# Patient Record
Sex: MALE | Race: WHITE | Employment: STUDENT | URBAN - METROPOLITAN AREA
[De-identification: names, ages, dates, MRNs, and addresses within clinical notes are randomized per-mention and may not be internally consistent; named-entity substitution may affect disease eponyms.]

---

## 2018-10-18 ENCOUNTER — OFFICE VISIT (OUTPATIENT)
Dept: FAMILY MEDICINE CLINIC | Facility: CLINIC | Age: 23
End: 2018-10-18
Payer: COMMERCIAL

## 2018-10-18 VITALS
HEIGHT: 72 IN | WEIGHT: 197 LBS | TEMPERATURE: 98 F | SYSTOLIC BLOOD PRESSURE: 114 MMHG | HEART RATE: 79 BPM | BODY MASS INDEX: 26.68 KG/M2 | OXYGEN SATURATION: 98 % | DIASTOLIC BLOOD PRESSURE: 78 MMHG | RESPIRATION RATE: 18 BRPM

## 2018-10-18 DIAGNOSIS — J02.9 SORE THROAT: Primary | ICD-10-CM

## 2018-10-18 PROCEDURE — 87081 CULTURE SCREEN ONLY: CPT | Performed by: FAMILY MEDICINE

## 2018-10-18 PROCEDURE — 87880 STREP A ASSAY W/OPTIC: CPT | Performed by: FAMILY MEDICINE

## 2018-10-18 PROCEDURE — 99203 OFFICE O/P NEW LOW 30 MIN: CPT | Performed by: FAMILY MEDICINE

## 2018-10-18 NOTE — PROGRESS NOTES
Patient presents with:  Sore Throat: x 5 days       Melany Handy is a 20year old male who presents for sore throat. Pain of the throat last  5  days. Endorses odynophagia with food and liquid.  Appetite normal, normal energy level, Denies subjective feve Hearing normal.Ear canals bilaterally: noerythema,no tenderness, no discharge. THROAT:mild erythema,no exudates, 1+tonsills, moist oral mucosa.   NECK: supple,no preauricular, auricular, cervical adenopathy palpable  LUNGS: clear to auscultation bilaterall

## 2018-10-23 ENCOUNTER — TELEPHONE (OUTPATIENT)
Dept: FAMILY MEDICINE CLINIC | Facility: CLINIC | Age: 23
End: 2018-10-23

## 2018-10-23 ENCOUNTER — OFFICE VISIT (OUTPATIENT)
Dept: FAMILY MEDICINE CLINIC | Facility: CLINIC | Age: 23
End: 2018-10-23
Payer: COMMERCIAL

## 2018-10-23 VITALS
SYSTOLIC BLOOD PRESSURE: 134 MMHG | RESPIRATION RATE: 14 BRPM | HEART RATE: 88 BPM | TEMPERATURE: 98 F | BODY MASS INDEX: 23.26 KG/M2 | HEIGHT: 77 IN | WEIGHT: 197 LBS | DIASTOLIC BLOOD PRESSURE: 74 MMHG

## 2018-10-23 DIAGNOSIS — Z23 NEED FOR INFLUENZA VACCINATION: ICD-10-CM

## 2018-10-23 DIAGNOSIS — J02.9 ACUTE PHARYNGITIS, UNSPECIFIED ETIOLOGY: Primary | ICD-10-CM

## 2018-10-23 PROCEDURE — 99213 OFFICE O/P EST LOW 20 MIN: CPT | Performed by: FAMILY MEDICINE

## 2018-10-23 RX ORDER — AMOXICILLIN 875 MG/1
875 TABLET, COATED ORAL 2 TIMES DAILY
Qty: 20 TABLET | Refills: 0 | Status: SHIPPED | OUTPATIENT
Start: 2018-10-23 | End: 2018-11-02

## 2018-10-23 NOTE — PATIENT INSTRUCTIONS
Thank you for choosing Misty Miller MD at [place] [address]  To Do: Micah Wheat  1. Please take meds as directed. Aleix [facility] is located in [address]. Monday, Tuesday & Friday – 8 a.m. to 4 p.m. Wednesday, Thursday – 7 a.m. to 3 p.m. those potential risks and we strive to make you healthier and to improve your quality of life.     Referrals, and Radiology Information:    If your insurance requires a referral to a specialist, please allow 5 business days to process your referral request. gargling with 1 of these solutions:  · 1/4 teaspoon of salt in 1/2 cup of warm water  · An over-the-counter anesthetic gargle  Use medicine for more relief  Over-the-counter medicine can reduce sore throat symptoms.  Ask your pharmacist if you have question

## 2018-10-23 NOTE — H&P
[address]    History and Physical    Kaylee Zavala Patient Status:  No patient class for patient encounter    6/10/1995 MRN [id number]   Location [facility] , [address] [place] [address] Attending No att.  pr Review of Systems:     Constitutional: Negative for chills, fatigue and fever. Respiratory: Negative for shortness of breath. Cardiovascular: Negative for chest pain, palpitations and leg swelling.    Gastrointestinal: Negative for nausea, vomiting, ab BUN 10 03/16/2014     03/16/2014    K 4.0 03/16/2014     03/16/2014    CO2 28.0 03/16/2014    GLU 89 03/16/2014    CA 8.7 (L) 03/16/2014    ALB 4.1 03/16/2014    ALKPHO 103 03/16/2014    BILT 0.6 03/16/2014    TP 7.9 03/16/2014    AST 39 03/16

## 2019-02-19 ENCOUNTER — OFFICE VISIT (OUTPATIENT)
Dept: FAMILY MEDICINE CLINIC | Facility: CLINIC | Age: 24
End: 2019-02-19
Payer: COMMERCIAL

## 2019-02-19 VITALS
WEIGHT: 183 LBS | HEART RATE: 80 BPM | HEIGHT: 72 IN | BODY MASS INDEX: 24.79 KG/M2 | SYSTOLIC BLOOD PRESSURE: 126 MMHG | RESPIRATION RATE: 16 BRPM | DIASTOLIC BLOOD PRESSURE: 70 MMHG | TEMPERATURE: 98 F

## 2019-02-19 DIAGNOSIS — B37.0 ORAL THRUSH: Primary | ICD-10-CM

## 2019-02-19 PROCEDURE — 99213 OFFICE O/P EST LOW 20 MIN: CPT | Performed by: FAMILY MEDICINE

## 2019-02-19 NOTE — PATIENT INSTRUCTIONS
Thank you for choosing Gurdeep Bear MD at [place] [address]  To Do: Bassam Robert  1. Please take meds as directed. Phillipdavid Harry Hernandez is located in [address]. Monday, Tuesday & Friday – 8 a.m. to 4 p.m. Wednesday, Thursday – 7 a.m. to 3 p.m. those potential risks and we strive to make you healthier and to improve your quality of life.     Referrals, and Radiology Information:    If your insurance requires a referral to a specialist, please allow 5 business days to process your referral request. can grow all over the body. Matthew Socks refers to an infection of only the mouth and throat. What causes thrush? Matthew Socks happens when something lets too much Candida grow inside your mouth and throat.  Certain things that change the normal balance of organisms through an intravenous line ( IV). These treatments depend on how severe your infection is, and what other health conditions you have. If you are at high risk for thrush, you may need to keep taking oral antifungal medicine.  This is to help prevent thrush

## 2019-02-19 NOTE — PROGRESS NOTES
HPI:    Patient ID: Essie Reina is a 20year old male. HPI  Mr. Luisito Ignacio is a pleasant 21 y/o M who has been generally healthy here for whitish discoloration of his tongue which he has had for about 2 weeks associated with lesions inside his mouth.   He Musculoskeletal: He exhibits no edema. Lymphadenopathy:     He has no cervical adenopathy. Neurological: He is alert. Psychiatric: He has a normal mood and affect. Vitals reviewed.              ASSESSMENT/PLAN:   Oral thrush  (primary encounter diag

## 2019-03-18 ENCOUNTER — LAB ENCOUNTER (OUTPATIENT)
Dept: LAB | Age: 24
End: 2019-03-18
Attending: FAMILY MEDICINE
Payer: COMMERCIAL

## 2019-03-18 ENCOUNTER — OFFICE VISIT (OUTPATIENT)
Dept: FAMILY MEDICINE CLINIC | Facility: CLINIC | Age: 24
End: 2019-03-18
Payer: COMMERCIAL

## 2019-03-18 VITALS
HEART RATE: 70 BPM | SYSTOLIC BLOOD PRESSURE: 120 MMHG | BODY MASS INDEX: 25.33 KG/M2 | HEIGHT: 72 IN | WEIGHT: 187 LBS | DIASTOLIC BLOOD PRESSURE: 76 MMHG | TEMPERATURE: 97 F | RESPIRATION RATE: 16 BRPM

## 2019-03-18 DIAGNOSIS — B37.0 THRUSH, ORAL: Primary | ICD-10-CM

## 2019-03-18 DIAGNOSIS — B37.0 THRUSH, ORAL: ICD-10-CM

## 2019-03-18 LAB
ALBUMIN SERPL-MCNC: 4.2 G/DL (ref 3.4–5)
ALBUMIN/GLOB SERPL: 1.2 {RATIO} (ref 1–2)
ALP LIVER SERPL-CCNC: 75 U/L (ref 45–117)
ALT SERPL-CCNC: 33 U/L (ref 16–61)
ANION GAP SERPL CALC-SCNC: 7 MMOL/L (ref 0–18)
AST SERPL-CCNC: 126 U/L (ref 15–37)
BASOPHILS # BLD AUTO: 0.03 X10(3) UL (ref 0–0.2)
BASOPHILS NFR BLD AUTO: 0.6 %
BILIRUB SERPL-MCNC: 0.6 MG/DL (ref 0.1–2)
BUN BLD-MCNC: 9 MG/DL (ref 7–18)
BUN/CREAT SERPL: 8.8 (ref 10–20)
CALCIUM BLD-MCNC: 9.3 MG/DL (ref 8.5–10.1)
CHLORIDE SERPL-SCNC: 105 MMOL/L (ref 98–107)
CO2 SERPL-SCNC: 28 MMOL/L (ref 21–32)
CREAT BLD-MCNC: 1.02 MG/DL (ref 0.7–1.3)
DEPRECATED RDW RBC AUTO: 39.6 FL (ref 35.1–46.3)
EOSINOPHIL # BLD AUTO: 0.11 X10(3) UL (ref 0–0.7)
EOSINOPHIL NFR BLD AUTO: 2.4 %
ERYTHROCYTE [DISTWIDTH] IN BLOOD BY AUTOMATED COUNT: 12.6 % (ref 11–15)
GLOBULIN PLAS-MCNC: 3.6 G/DL (ref 2.8–4.4)
GLUCOSE BLD-MCNC: 87 MG/DL (ref 70–99)
HCT VFR BLD AUTO: 47.3 % (ref 39–53)
HGB BLD-MCNC: 15.8 G/DL (ref 13–17.5)
IMM GRANULOCYTES # BLD AUTO: 0.02 X10(3) UL (ref 0–1)
IMM GRANULOCYTES NFR BLD: 0.4 %
LYMPHOCYTES # BLD AUTO: 1.31 X10(3) UL (ref 1–4)
LYMPHOCYTES NFR BLD AUTO: 28.1 %
M PROTEIN MFR SERPL ELPH: 7.8 G/DL (ref 6.4–8.2)
MCH RBC QN AUTO: 29 PG (ref 26–34)
MCHC RBC AUTO-ENTMCNC: 33.4 G/DL (ref 31–37)
MCV RBC AUTO: 86.9 FL (ref 80–100)
MONOCYTES # BLD AUTO: 0.48 X10(3) UL (ref 0.1–1)
MONOCYTES NFR BLD AUTO: 10.3 %
NEUTROPHILS # BLD AUTO: 2.72 X10 (3) UL (ref 1.5–7.7)
NEUTROPHILS # BLD AUTO: 2.72 X10(3) UL (ref 1.5–7.7)
NEUTROPHILS NFR BLD AUTO: 58.2 %
OSMOLALITY SERPL CALC.SUM OF ELEC: 288 MOSM/KG (ref 275–295)
PLATELET # BLD AUTO: 209 10(3)UL (ref 150–450)
POTASSIUM SERPL-SCNC: 3.9 MMOL/L (ref 3.5–5.1)
RBC # BLD AUTO: 5.44 X10(6)UL (ref 4.3–5.7)
SODIUM SERPL-SCNC: 140 MMOL/L (ref 136–145)
WBC # BLD AUTO: 4.7 X10(3) UL (ref 4–11)

## 2019-03-18 PROCEDURE — 85025 COMPLETE CBC W/AUTO DIFF WBC: CPT

## 2019-03-18 PROCEDURE — 36415 COLL VENOUS BLD VENIPUNCTURE: CPT

## 2019-03-18 PROCEDURE — 80053 COMPREHEN METABOLIC PANEL: CPT

## 2019-03-18 PROCEDURE — 99213 OFFICE O/P EST LOW 20 MIN: CPT | Performed by: FAMILY MEDICINE

## 2019-03-18 RX ORDER — FLUCONAZOLE 100 MG/1
100 TABLET ORAL DAILY
Qty: 7 TABLET | Refills: 1 | Status: SHIPPED | OUTPATIENT
Start: 2019-03-18 | End: 2019-03-25

## 2019-03-18 NOTE — PATIENT INSTRUCTIONS
Thank you for choosing Pasquale Benton MD at [place] [address]  To Do: Juan Go  1. Please take meds as directed. Alexi Closter is located in [address]. Monday, Tuesday & Friday – 8 a.m. to 4 p.m. Wednesday, Thursday – 7 a.m. to 3 p.m. those potential risks and we strive to make you healthier and to improve your quality of life.     Referrals, and Radiology Information:    If your insurance requires a referral to a specialist, please allow 5 business days to process your referral request.

## 2019-03-18 NOTE — PROGRESS NOTES
HPI:    Patient ID: Rudi Garvin is a 20year old male. HPI  Mr. Shay Nava is a pleasant 21 y/o M who has been generally healthy who I saw a few days ago for oral thrush and had prescribed him with nystatin.   He reports that he had taken this medication Signed Prescriptions Disp Refills   • fluconazole 100 MG Oral Tab 7 tablet 1     Sig: Take 1 tablet (100 mg total) by mouth daily for 7 days.        Imaging & Referrals:  None       RR#[id number]

## 2019-03-19 DIAGNOSIS — R74.01 ELEVATED ALT MEASUREMENT: Primary | ICD-10-CM

## 2019-03-19 DIAGNOSIS — R74.01 ELEVATED AST (SGOT): ICD-10-CM

## 2019-03-25 ENCOUNTER — OFFICE VISIT (OUTPATIENT)
Dept: FAMILY MEDICINE CLINIC | Facility: CLINIC | Age: 24
End: 2019-03-25
Payer: COMMERCIAL

## 2019-03-25 ENCOUNTER — APPOINTMENT (OUTPATIENT)
Dept: LAB | Age: 24
End: 2019-03-25
Attending: FAMILY MEDICINE
Payer: COMMERCIAL

## 2019-03-25 VITALS
HEART RATE: 70 BPM | WEIGHT: 187 LBS | RESPIRATION RATE: 16 BRPM | TEMPERATURE: 98 F | BODY MASS INDEX: 25.33 KG/M2 | HEIGHT: 72 IN | DIASTOLIC BLOOD PRESSURE: 78 MMHG | SYSTOLIC BLOOD PRESSURE: 124 MMHG

## 2019-03-25 DIAGNOSIS — R74.01 ELEVATED AST (SGOT): ICD-10-CM

## 2019-03-25 DIAGNOSIS — B37.0 ORAL THRUSH: Primary | ICD-10-CM

## 2019-03-25 LAB
ALBUMIN SERPL-MCNC: 4.5 G/DL (ref 3.4–5)
ALBUMIN/GLOB SERPL: 1.2 {RATIO} (ref 1–2)
ALP LIVER SERPL-CCNC: 71 U/L (ref 45–117)
ALT SERPL-CCNC: 32 U/L (ref 16–61)
ANION GAP SERPL CALC-SCNC: 5 MMOL/L (ref 0–18)
AST SERPL-CCNC: 26 U/L (ref 15–37)
BILIRUB SERPL-MCNC: 0.7 MG/DL (ref 0.1–2)
BUN BLD-MCNC: 8 MG/DL (ref 7–18)
BUN/CREAT SERPL: 6.3 (ref 10–20)
CALCIUM BLD-MCNC: 9.1 MG/DL (ref 8.5–10.1)
CHLORIDE SERPL-SCNC: 109 MMOL/L (ref 98–107)
CO2 SERPL-SCNC: 30 MMOL/L (ref 21–32)
CREAT BLD-MCNC: 1.27 MG/DL (ref 0.7–1.3)
GLOBULIN PLAS-MCNC: 3.7 G/DL (ref 2.8–4.4)
GLUCOSE BLD-MCNC: 102 MG/DL (ref 70–99)
M PROTEIN MFR SERPL ELPH: 8.2 G/DL (ref 6.4–8.2)
OSMOLALITY SERPL CALC.SUM OF ELEC: 297 MOSM/KG (ref 275–295)
POTASSIUM SERPL-SCNC: 4 MMOL/L (ref 3.5–5.1)
SODIUM SERPL-SCNC: 144 MMOL/L (ref 136–145)

## 2019-03-25 PROCEDURE — 99213 OFFICE O/P EST LOW 20 MIN: CPT | Performed by: FAMILY MEDICINE

## 2019-03-25 PROCEDURE — 36415 COLL VENOUS BLD VENIPUNCTURE: CPT

## 2019-03-25 PROCEDURE — 80053 COMPREHEN METABOLIC PANEL: CPT

## 2019-03-25 NOTE — PROGRESS NOTES
HPI:    Patient ID: Raffi Pike is a 20year old male. HPI  Mr. Chai Jones is a pleasant 21 y/o M here today for follow-up for oral thrush. I saw him last week and started him on fluconazole 100 mg daily. No side effects from the medications.   He repor He exhibits no distension. There is no tenderness. Musculoskeletal: He exhibits no edema. Lymphadenopathy:     He has no cervical adenopathy. Neurological: He is alert. Psychiatric: He has a normal mood and affect.  His behavior is normal.   Vitals

## 2019-03-25 NOTE — PATIENT INSTRUCTIONS
Thank you for choosing Wendi Tellez MD at [place] [address]  To Do: Radames Lerner  1. Please see info below  Zank is located in [address]. Monday, Tuesday & Friday – 8 a.m. to 4 p.m. Wednesday, Thursday – 7 a.m. to 3 p.m.   The lab potential risks and we strive to make you healthier and to improve your quality of life.     Referrals, and Radiology Information:    If your insurance requires a referral to a specialist, please allow 5 business days to process your referral request.    If grow all over the body. Indira Reins refers to an infection of only the mouth and throat. What causes thrush? Indira Reins happens when something lets too much Candida grow inside your mouth and throat.  Certain things that change the normal balance of organisms in t through an intravenous line ( IV). These treatments depend on how severe your infection is, and what other health conditions you have. If you are at high risk for thrush, you may need to keep taking oral antifungal medicine.  This is to help prevent thrush

## 2021-02-26 ENCOUNTER — TELEMEDICINE (OUTPATIENT)
Dept: FAMILY MEDICINE CLINIC | Facility: CLINIC | Age: 26
End: 2021-02-26
Payer: COMMERCIAL

## 2021-02-26 ENCOUNTER — LAB ENCOUNTER (OUTPATIENT)
Dept: LAB | Age: 26
End: 2021-02-26
Attending: FAMILY MEDICINE
Payer: COMMERCIAL

## 2021-02-26 DIAGNOSIS — J02.9 SORE THROAT: ICD-10-CM

## 2021-02-26 DIAGNOSIS — J02.9 SORE THROAT: Primary | ICD-10-CM

## 2021-02-26 PROCEDURE — 99214 OFFICE O/P EST MOD 30 MIN: CPT | Performed by: FAMILY MEDICINE

## 2021-02-26 RX ORDER — PENICILLIN V POTASSIUM 500 MG/1
500 TABLET ORAL 2 TIMES DAILY
Qty: 20 TABLET | Refills: 0 | Status: SHIPPED | OUTPATIENT
Start: 2021-02-26 | End: 2021-03-08

## 2021-02-26 NOTE — PROGRESS NOTES
Video Visit- Sharad   This is a telemedicine visit with live, interactive video and audio.      Clair Sanderson understands and accepts financial responsibility for any deductible, co-insurance and/or co-pays Future  - penicillin v potassium 500 MG Oral Tab; Take 1 tablet (500 mg total) by mouth 2 (two) times a day for 10 days. Dispense: 20 tablet;  Refill: 0  -Diff dx: strep pharyngitis, viral pharyngitis (covid vs non-covid strains), vs other  -discussed ge

## 2021-02-27 LAB — SARS-COV-2 RNA RESP QL NAA+PROBE: NOT DETECTED

## 2024-03-21 ENCOUNTER — OFFICE VISIT (OUTPATIENT)
Dept: FAMILY MEDICINE CLINIC | Facility: CLINIC | Age: 29
End: 2024-03-21
Payer: COMMERCIAL

## 2024-03-21 ENCOUNTER — LAB ENCOUNTER (OUTPATIENT)
Dept: LAB | Age: 29
End: 2024-03-21
Attending: FAMILY MEDICINE
Payer: COMMERCIAL

## 2024-03-21 VITALS
DIASTOLIC BLOOD PRESSURE: 70 MMHG | TEMPERATURE: 98 F | OXYGEN SATURATION: 98 % | RESPIRATION RATE: 16 BRPM | HEART RATE: 74 BPM | HEIGHT: 72 IN | WEIGHT: 219 LBS | BODY MASS INDEX: 29.66 KG/M2 | SYSTOLIC BLOOD PRESSURE: 122 MMHG

## 2024-03-21 DIAGNOSIS — G43.809 OTHER MIGRAINE WITHOUT STATUS MIGRAINOSUS, NOT INTRACTABLE: ICD-10-CM

## 2024-03-21 DIAGNOSIS — R53.83 OTHER FATIGUE: ICD-10-CM

## 2024-03-21 DIAGNOSIS — M25.50 ARTHRALGIA, UNSPECIFIED JOINT: ICD-10-CM

## 2024-03-21 DIAGNOSIS — K58.0 IRRITABLE BOWEL SYNDROME WITH DIARRHEA: ICD-10-CM

## 2024-03-21 DIAGNOSIS — R19.7 DIARRHEA, UNSPECIFIED TYPE: ICD-10-CM

## 2024-03-21 DIAGNOSIS — D17.79 LIPOMA OF OTHER SPECIFIED SITES: ICD-10-CM

## 2024-03-21 DIAGNOSIS — Z00.00 WELLNESS EXAMINATION: Primary | ICD-10-CM

## 2024-03-21 DIAGNOSIS — M25.50 ARTHRALGIA, UNSPECIFIED JOINT: Primary | ICD-10-CM

## 2024-03-21 DIAGNOSIS — H93.13 TINNITUS OF BOTH EARS: ICD-10-CM

## 2024-03-21 DIAGNOSIS — F41.9 ANXIETY: ICD-10-CM

## 2024-03-21 LAB
ALBUMIN SERPL-MCNC: 4.3 G/DL (ref 3.4–5)
ALBUMIN/GLOB SERPL: 1.1 {RATIO} (ref 1–2)
ALP LIVER SERPL-CCNC: 79 U/L
ALT SERPL-CCNC: 53 U/L
ANION GAP SERPL CALC-SCNC: 3 MMOL/L (ref 0–18)
AST SERPL-CCNC: 24 U/L (ref 15–37)
BASOPHILS # BLD AUTO: 0.04 X10(3) UL (ref 0–0.2)
BASOPHILS NFR BLD AUTO: 0.5 %
BILIRUB SERPL-MCNC: 0.6 MG/DL (ref 0.1–2)
BUN BLD-MCNC: 14 MG/DL (ref 9–23)
CALCIUM BLD-MCNC: 10 MG/DL (ref 8.5–10.1)
CHLORIDE SERPL-SCNC: 108 MMOL/L (ref 98–112)
CHOLEST SERPL-MCNC: 169 MG/DL (ref ?–200)
CO2 SERPL-SCNC: 28 MMOL/L (ref 21–32)
CREAT BLD-MCNC: 1.11 MG/DL
CRP SERPL-MCNC: <0.29 MG/DL (ref ?–0.3)
EGFRCR SERPLBLD CKD-EPI 2021: 92 ML/MIN/1.73M2 (ref 60–?)
EOSINOPHIL # BLD AUTO: 0.15 X10(3) UL (ref 0–0.7)
EOSINOPHIL NFR BLD AUTO: 1.9 %
ERYTHROCYTE [DISTWIDTH] IN BLOOD BY AUTOMATED COUNT: 12.6 %
ERYTHROCYTE [SEDIMENTATION RATE] IN BLOOD: 11 MM/HR
FASTING PATIENT LIPID ANSWER: NO
FASTING STATUS PATIENT QL REPORTED: NO
GLOBULIN PLAS-MCNC: 3.9 G/DL (ref 2.8–4.4)
GLUCOSE BLD-MCNC: 101 MG/DL (ref 70–99)
HCT VFR BLD AUTO: 46 %
HDLC SERPL-MCNC: 30 MG/DL (ref 40–59)
HGB BLD-MCNC: 16.2 G/DL
IMM GRANULOCYTES # BLD AUTO: 0.04 X10(3) UL (ref 0–1)
IMM GRANULOCYTES NFR BLD: 0.5 %
LDLC SERPL CALC-MCNC: 75 MG/DL (ref ?–100)
LYMPHOCYTES # BLD AUTO: 1.9 X10(3) UL (ref 1–4)
LYMPHOCYTES NFR BLD AUTO: 23.6 %
MCH RBC QN AUTO: 29 PG (ref 26–34)
MCHC RBC AUTO-ENTMCNC: 35.2 G/DL (ref 31–37)
MCV RBC AUTO: 82.3 FL
MONOCYTES # BLD AUTO: 0.5 X10(3) UL (ref 0.1–1)
MONOCYTES NFR BLD AUTO: 6.2 %
NEUTROPHILS # BLD AUTO: 5.41 X10 (3) UL (ref 1.5–7.7)
NEUTROPHILS # BLD AUTO: 5.41 X10(3) UL (ref 1.5–7.7)
NEUTROPHILS NFR BLD AUTO: 67.3 %
NONHDLC SERPL-MCNC: 139 MG/DL (ref ?–130)
OSMOLALITY SERPL CALC.SUM OF ELEC: 289 MOSM/KG (ref 275–295)
PLATELET # BLD AUTO: 234 10(3)UL (ref 150–450)
POTASSIUM SERPL-SCNC: 4.2 MMOL/L (ref 3.5–5.1)
PROT SERPL-MCNC: 8.2 G/DL (ref 6.4–8.2)
RBC # BLD AUTO: 5.59 X10(6)UL
RHEUMATOID FACT SERPL-ACNC: <10 IU/ML (ref ?–15)
SODIUM SERPL-SCNC: 139 MMOL/L (ref 136–145)
T4 FREE SERPL-MCNC: 0.9 NG/DL (ref 0.8–1.7)
TRIGL SERPL-MCNC: 398 MG/DL (ref 30–149)
TSI SER-ACNC: 0.56 MIU/ML (ref 0.36–3.74)
URATE SERPL-MCNC: 5.6 MG/DL
VIT B12 SERPL-MCNC: 742 PG/ML (ref 193–986)
VIT D+METAB SERPL-MCNC: 8.4 NG/ML (ref 30–100)
VLDLC SERPL CALC-MCNC: 63 MG/DL (ref 0–30)
WBC # BLD AUTO: 8 X10(3) UL (ref 4–11)

## 2024-03-21 PROCEDURE — 82607 VITAMIN B-12: CPT | Performed by: FAMILY MEDICINE

## 2024-03-21 PROCEDURE — 86200 CCP ANTIBODY: CPT | Performed by: FAMILY MEDICINE

## 2024-03-21 PROCEDURE — 82306 VITAMIN D 25 HYDROXY: CPT

## 2024-03-21 PROCEDURE — 3008F BODY MASS INDEX DOCD: CPT | Performed by: FAMILY MEDICINE

## 2024-03-21 PROCEDURE — 80061 LIPID PANEL: CPT | Performed by: FAMILY MEDICINE

## 2024-03-21 PROCEDURE — 84439 ASSAY OF FREE THYROXINE: CPT | Performed by: FAMILY MEDICINE

## 2024-03-21 PROCEDURE — 3078F DIAST BP <80 MM HG: CPT | Performed by: FAMILY MEDICINE

## 2024-03-21 PROCEDURE — 84402 ASSAY OF FREE TESTOSTERONE: CPT

## 2024-03-21 PROCEDURE — 86038 ANTINUCLEAR ANTIBODIES: CPT

## 2024-03-21 PROCEDURE — 86225 DNA ANTIBODY NATIVE: CPT

## 2024-03-21 PROCEDURE — 86140 C-REACTIVE PROTEIN: CPT | Performed by: FAMILY MEDICINE

## 2024-03-21 PROCEDURE — 99214 OFFICE O/P EST MOD 30 MIN: CPT | Performed by: FAMILY MEDICINE

## 2024-03-21 PROCEDURE — 86431 RHEUMATOID FACTOR QUANT: CPT | Performed by: FAMILY MEDICINE

## 2024-03-21 PROCEDURE — 3074F SYST BP LT 130 MM HG: CPT | Performed by: FAMILY MEDICINE

## 2024-03-21 PROCEDURE — 85652 RBC SED RATE AUTOMATED: CPT | Performed by: FAMILY MEDICINE

## 2024-03-21 PROCEDURE — 36415 COLL VENOUS BLD VENIPUNCTURE: CPT | Performed by: FAMILY MEDICINE

## 2024-03-21 PROCEDURE — 80053 COMPREHEN METABOLIC PANEL: CPT | Performed by: FAMILY MEDICINE

## 2024-03-21 PROCEDURE — 99385 PREV VISIT NEW AGE 18-39: CPT | Performed by: FAMILY MEDICINE

## 2024-03-21 PROCEDURE — 84403 ASSAY OF TOTAL TESTOSTERONE: CPT

## 2024-03-21 PROCEDURE — 84443 ASSAY THYROID STIM HORMONE: CPT | Performed by: FAMILY MEDICINE

## 2024-03-21 PROCEDURE — 85025 COMPLETE CBC W/AUTO DIFF WBC: CPT | Performed by: FAMILY MEDICINE

## 2024-03-21 PROCEDURE — 84550 ASSAY OF BLOOD/URIC ACID: CPT | Performed by: FAMILY MEDICINE

## 2024-03-21 RX ORDER — SUMATRIPTAN 25 MG/1
25 TABLET, FILM COATED ORAL EVERY 2 HOUR PRN
Qty: 9 TABLET | Refills: 1 | Status: SHIPPED | OUTPATIENT
Start: 2024-03-21

## 2024-03-21 RX ORDER — DICYCLOMINE HYDROCHLORIDE 10 MG/1
10 CAPSULE ORAL 4 TIMES DAILY
Qty: 40 CAPSULE | Refills: 3 | Status: SHIPPED | OUTPATIENT
Start: 2024-03-21 | End: 2024-03-31

## 2024-03-21 NOTE — PROGRESS NOTES
Wellness Exam    REASON FOR VISIT:    Mikael Matthews is a 28 year old male who presents for an Annual Health Assessment.    Current Complaints: Mr. Matthews is here for his wellness exam  Flu Shot: see immunization record  Health Maintenance Topics with due status: Overdue       Topic Date Due    DTaP,Tdap,and Td Vaccines 01/02/2007    COVID-19 Vaccine Never done    Influenza Vaccine Never done     Reported Health:   Mr. Matthews is a pleasant 28-year-old gentleman initially presenting for his wellness exam but has had multiple concerns.  It has been a few years since I have seen him.  First, he has been experiencing multiple joint pain for the past several years.  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  He would hear and feel some of his joints crack.  He feels tired all the time.  He is trying to apply for VA disability.  He also has been experiencing headaches mostly on the bilateral occipital region of his head.  He is not sleeping well and seems to be preoccupied with thoughts.  When he was in the  he did not have an adverse experience but just hearing that some of his peers or her in action would make him anxious and worry.  He also has been having ringing in both ears which has been ongoing for years.  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  He also experiences diarrhea multiple times a day associated with abdominal cramping especially after he eats.  He also has a lesion on his left upper abdomen and left upper arm but does not report any pain.  He thinks that this might be fatty tumors.      I had reviewed past medical and family histories together with allergy and medication lists documented.    Details about the complaints:  As abovementioned    General Health                                   At any time do you feel concerned for the safety/well-being of yourself and/or your  children, in your home or elsewhere?: No     CAGE:     Cut: Have you ever felt you should Cut down on your drinking?: No    Annoyed: Have people Annoyed you by criticizing your drinking?: No    Guilty: Have you ever felt bad or Guilty about your drinking?: No    Eye Opener: Have you ever had a drink first thing in the morning to steady your nerves or to get rid of a hangover (Eye opener)?: No    Scoring  Total Score: 0     PHQ-4: Over the LAST 2 WEEKS       Depression Screening (PHQ-2/PHQ-9): Over the LAST 2 WEEKS                            PREVENTATIVE SERVICES  INDICATIONS AND SCHEDULE Recommendation Internal Lab or Procedure External Lab or Procedure   Colonoscopy Screen Every 10 years No recommendations at this time    Flex Sigmoidoscopy Screen  Every 5 years No results found for this or any previous visit.    Fecal Occult Blood  Annually No results found for: \"FOB\", \"OCCULTSTOOL\"    Obesity Screening Screen all adults annually Body mass index is 29.7 kg/m².      Preventive Services for Which Recommendations Vary with Risk Recommendation Internal Lab or Procedure External Lab or Procedure   Cholesterol Screening Recommended screening varies with age, risk and gender No results found for: \"LDL\", \"LDLC\"    Diabetes Screening  If history of high blood pressure or other  risk factors No results found for: \"A1C\"  Glucose (mg/dL)   Date Value   03/25/2019 102 (H)     GLUCOSE (mg/dL)   Date Value   03/16/2014 89         Gonorrhea Screening If high risk No results found for: \"GONOCOCCUS\"    HIV Screening For all adults age 18-65, older adults at increased risk No results found for: \"HIV\"    Syphilis Screening Screen if pregnant or high risk No results found for: \"RPR\"    Hepatitis C Screening Screen those at high risk plus screen one time for adults born 1947-1 9[birth year] No results found for: \"HCVAB\"    Tuberculosis Screen If high risk No components found for: \"PPDINDURAT\"      ALLERGIES:   No Known Allergies    CURRENT  MEDICATIONS:   Current Outpatient Medications   Medication Sig Dispense Refill    SUMAtriptan (IMITREX) 25 MG Oral Tab Take 1 tablet (25 mg total) by mouth every 2 (two) hours as needed for Migraine. Use at onset; repeat once after 2 HRS-ONLY 2 IN 24 HR MAX 9 tablet 1    dicyclomine 10 MG Oral Cap Take 1 capsule (10 mg total) by mouth 4 (four) times daily for 10 days. 40 capsule 3      MEDICAL INFORMATION:   No past medical history on file.   Past Surgical History:   Procedure Laterality Date    APPENDECTOMY        Family History   Problem Relation Age of Onset    Depression Father     Other (Migraine Headache) Father     No Known Problems Mother       SOCIAL HISTORY:   Social History     Socioeconomic History    Marital status: Single   Tobacco Use    Smoking status: Never    Smokeless tobacco: Never   Substance and Sexual Activity    Alcohol use: Yes     Comment: socially     Drug use: No   Other Topics Concern    Caffeine Concern Yes     Comment: Daily; 1 can of soda    Exercise Yes     Comment: Weekly; 2-3           REVIEW OF SYSTEMS:   Constitutional: Negative for fever, chills   HENT: Negative for hearing loss, congestion, sore throat and neck pain.    Eyes: Negative for pain and visual disturbance.   Respiratory: Negative for cough and shortness of breath.    Cardiovascular: Negative for chest pain and palpitations.   Gastrointestinal: Negative for nausea, vomiting.   Genitourinary: Negative for urgency, frequency and difficulty urinating.   Musculoskeletal:no gait problem.   Skin: Negative for color change and rash.   Neurological: Negative for tremors, weakness and numbness.   Hematological: Negative for adenopathy. Does not bruise/bleed easily.   Psychiatric/Behavioral: Negative for confusion and agitation. The patient is not nervous/anxious.      EXAM:   /70   Pulse 74   Temp 98 °F (36.7 °C) (Temporal)   Resp 16   Ht 6' (1.829 m)   Wt 219 lb (99.3 kg)   SpO2 98%   BMI 29.70 kg/m²    Constitutional: He appears well-developed, nourished, and his stated age. Vital signs reviewed  Pleasant man   Head: Normocephalic and atraumatic.   Ear: TMs visible and normal bilaterally  Nose: Nose normal.   Eyes: EOM are normal. Pupils are equal, round, and reactive to light. No scleral icterus.   Neck: Normal range of motion. No thyromegaly present.   Cardiovascular: Normal rate, regular rhythm and normal heart sounds.  Exam reveals no friction rub.    No murmur heard.  Pulmonary/Chest: Effort normal and breath sounds normal. He has no wheezes. He has no rales.   Abdominal: Soft. Bowel sounds are normal. There is no tenderness.   Musculoskeletal: Normal range of motion. He exhibits no edema.   Lymphadenopathy:     He has no cervical adenopathy.   Neurological: He is alert and oriented to person, place, and time. He has normal reflexes.   Skin: Skin is warm. No rash noted. No erythema. with normal hair  Palpable raised lesion noted on the left upper quadrant, mid aspect which was movable and soft which measured approximately 1 cm in size with no tenderness nor fluctuance nor discharge noted.  There was a similar lesion noted on the lateral aspect of the left upper arm  Psychiatric: He has a normal mood and affect. His behavior is normal.     ASSESSMENT AND OTHER RELEVANT CHRONIC CONDITIONS:   Mikael Matthews is a 28 year old male who presents for an Annual Health Assessment.   1. Wellness examination    2. Arthralgia, unspecified joint    3. Anxiety    4. Irritable bowel syndrome with diarrhea    5. Other migraine without status migrainosus, not intractable    6. Lipoma of other specified sites    7. Other fatigue    8. Diarrhea, unspecified type    9. Tinnitus of both ears        PLAN SUMMARY:   Mikael Matthews is a 28 year old male  Age appropriate cancer screening, labs, safety, immunizations were discussed with the patient and ordered as follows:  Diagnoses and all orders for this visit:    Wellness  examination  -     CBC With Differential With Platelet  -     Comp Metabolic Panel (14)  -     Lipid Panel  -     TSH and Free T4    Arthralgia, unspecified joint  -     Rheumatoid Arthritis Factor  -     Uric Acid, Serum  -     Cyclic Citrullinate Peptide (CCP) antibodies  -     Sed Rate, Westergren (Automated)  -     C-Reactive Protein  -     Cancel: Alisha, Direct, Reflex To 9 [facility] ([facility])  -     Rheumatology Referral - In Network    Anxiety  -     [facility] Referral - In Network    Irritable bowel syndrome with diarrhea  -     dicyclomine 10 MG Oral Cap; Take 1 capsule (10 mg total) by mouth 4 (four) times daily for 10 days.  -     [facility] Referral - In Network    Other migraine without status migrainosus, not intractable  -     SUMAtriptan (IMITREX) 25 MG Oral Tab; Take 1 tablet (25 mg total) by mouth every 2 (two) hours as needed for Migraine. Use at onset; repeat once after 2 HRS-ONLY 2 IN 24 HR MAX    Lipoma of other specified sites  -I did assure him that these are benign in nature and will monitor at this point  Other fatigue  -     Vitamin B12 [E]  -     Vitamin D [E]; Future  -     Testosterone, Total Free, [E]; Future    Diarrhea, unspecified type  -     Gastro Referral - In Network    Tinnitus of both ears  -     ENT Referral - In Network    28-year-old gentleman with multiple concerns and symptoms today.  Please see above mention for plan.  We will await the evaluation and recommendations from primary specialist that I had referred him to.  I did ask him to follow-up after 4 weeks for reevaluation.      This note was prepared using Dragon Medical voice recognition dictation software. As a result errors may occur. When identified these errors have been corrected. While every attempt is made to correct errors during dictation discrepancies may still exist          Orders Placed This Encounter   Procedures    CBC With Differential With Platelet    Comp Metabolic Panel (14)    Lipid Panel    TSH and  Free T4    Rheumatoid Arthritis Factor    Uric Acid, Serum    Cyclic Citrullinate Peptide (CCP) antibodies    Sed Rate, Westergren (Automated)    C-Reactive Protein    Vitamin B12 [E]    Vitamin D [E]    Testosterone, Total Free, [E]       Imaging & Consults:  OP REFERRAL TO [facility]  GASTRO - INTERNAL  ENT - INTERNAL  RHEUMATOLOGY - INTERNAL    His 5 year prevention plan includes: annual visits for fasting labs  Health Maintenance   Topic Date Due    DTaP,Tdap,and Td Vaccines (3 - Tdap) 01/02/2007    COVID-19 Vaccine (1 - 2023-24 season) Never done    Influenza Vaccine (1) Never done    Annual Physical  03/21/2025    Annual Depression Screening  Completed    Pneumococcal Vaccine: Birth to 64yrs  Aged Out     Patient/Caregiver Education:  Patient/Caregiver Education: There are no barriers to learning. Medical education done.  Outcome: Patient verbalizes understanding.    Educated by: MD   The patient indicates understanding of these issues and agrees to the plan.    SUGGESTED VACCINATIONS - Influenza, Pneumococcal, Zoster, Tetanus     Immunization History   Administered Date(s) Administered    DTAP 01/01/1998    HEP B 01/01/1993    MMR 01/01/1994, 01/01/1998    TD 01/01/2007    Varicella 01/01/1998       Influenza Annually   Pneumococcal if high risk   Td/Tdap once then every 10 years   HPV Males 11-21   Zoster (Shingles) 60 and older: one dose   Varicella 2 doses if not immune   MMR 1-2 doses if born after 1958 and not immune     Patient Active Problem List   Diagnosis    Eosinophilic esophagitis    Scoliosis (and kyphoscoliosis), idiopathic     PREVENTIVE VISIT,NEW,18-39

## 2024-03-21 NOTE — PATIENT INSTRUCTIONS
Thank you for choosing Tye Matthews MD at [facility]  To Do: Mikael Matthews  1. Please see age appropriate health prevention below    [facility] is located in [address].  Monday, Tuesday & Friday - 8 a.m. to 4 p.m.  Wednesday, Thursday - 7 a.m. to 3 p.m.  The lab is closed daily from 12 p.m.-12:30 p.m.  Saturday lab hours by appointment. Call [phone] to schedule the appointment.   Please signup for [employer], which is electronic access to your record if you have not done so.  All your results will post on there.  [email or web address]   You can NOW use Isomark to book your appointments with us, or consider using open access scheduling which is through the [place] website [email or web address] and type in Tye Matthews MD and follow the links for \"Schedule Online Now\"    To schedule Imaging or tests at [facility] call Central Scheduling [phone], Go to [facility] A ER Building (For example: CT scans, X rays, Ultrasound, MRI)  Cardiac Testing in ER building Building A second floor Cardiac Testing [phone] (For example: Holter Monitor, Cardiac Stress tests,Event Monitor, or 2D Echocardiograms)  [facility] call [phone] usually in [facility] A  Walk in Clinic in [place] at [address] Mon-Fri at 8am-7:30 p.m., and Sat/Sun 9:00a.m.-4:30 p.m.  Also at [address] [place] [address]  Call [phone] for info     Please call our office about any questions regarding your treatment/medicines/tests as a result of today's visit.  For your safety, read the entire package insert of all medicines prescribed to you and be aware of all of the risks of treatment even beyond those discussed today.  All therapies have potential risk of harm or side effects or medication interactions.  It is your duty and for your safety to discuss with the pharmacist and our office with questions, and to notify us and stop treatment if problems arise, but know that our intention is that the  benefits outweigh those potential risks and we strive to make you healthier and to improve your quality of life.    Referrals, and Radiology Information:    If your insurance requires a referral to a specialist, please allow 5 business days to process your referral request.    If Tye Matthews MD orders a CT or MRI, it may take up to 10 business days to receive approval from your insurance company. Once our office has called informing you that the insurance company approved your testing, please call Central Scheduling at [phone]  Please allow our office 5 business days to contact you regarding any testing results.    Refill policies:   Allow 3 business days for refills; controlled substances may take longer and must be picked up from the office in person.  Narcotic medications can only be filled in 30 day increments and must be refilled at an office visit only.  If your prescription is due for a refill, you may be due for a follow-up appointment.  We cannot refill your maintenance medications at a preventative wellness visit.  To best provide you care, patients receiving maintenance medications need to be seen at least twice a year.

## 2024-03-23 LAB
FREE TESTOST DIRECT: 10.8 PG/ML
TESTOSTERONE: 292 NG/DL

## 2024-03-25 LAB
CCP IGG SERPL-ACNC: 1.6 U/ML (ref 0–6.9)
DSDNA IGG SERPL IA-ACNC: 1.6 IU/ML
ENA AB SER QL IA: 0.4 UG/L
ENA AB SER QL IA: NEGATIVE

## 2024-03-25 RX ORDER — ERGOCALCIFEROL 1.25 MG/1
50000 CAPSULE ORAL WEEKLY
Qty: 12 CAPSULE | Refills: 0 | Status: SHIPPED | OUTPATIENT
Start: 2024-03-25 | End: 2024-06-11

## 2024-04-15 PROBLEM — F32.A DEPRESSION: Status: ACTIVE | Noted: 2024-04-15

## 2024-04-15 PROBLEM — F43.10 PTSD (POST-TRAUMATIC STRESS DISORDER): Status: ACTIVE | Noted: 2024-04-15

## 2024-04-15 PROBLEM — E55.9 VITAMIN D DEFICIENCY: Status: ACTIVE | Noted: 2024-04-15

## 2024-04-15 PROBLEM — E78.1 HYPERTRIGLYCERIDEMIA: Status: ACTIVE | Noted: 2024-04-15

## 2024-04-30 ENCOUNTER — OFFICE VISIT (OUTPATIENT)
Facility: LOCATION | Age: 29
End: 2024-04-30
Payer: COMMERCIAL

## 2024-04-30 DIAGNOSIS — J34.2 DEVIATED NASAL SEPTUM: ICD-10-CM

## 2024-04-30 DIAGNOSIS — H93.13 TINNITUS OF BOTH EARS: Primary | ICD-10-CM

## 2024-04-30 DIAGNOSIS — H93.13 TINNITUS OF BOTH EARS: ICD-10-CM

## 2024-04-30 DIAGNOSIS — H93.293 ABNORMAL AUDITORY PERCEPTION, BILATERAL: Primary | ICD-10-CM

## 2024-04-30 PROCEDURE — 92567 TYMPANOMETRY: CPT | Performed by: AUDIOLOGIST

## 2024-04-30 PROCEDURE — 99204 OFFICE O/P NEW MOD 45 MIN: CPT | Performed by: OTOLARYNGOLOGY

## 2024-04-30 PROCEDURE — 92557 COMPREHENSIVE HEARING TEST: CPT | Performed by: AUDIOLOGIST

## 2024-04-30 RX ORDER — FLUTICASONE PROPIONATE 50 MCG
2 SPRAY, SUSPENSION (ML) NASAL DAILY
Qty: 16 G | Refills: 3 | Status: SHIPPED | OUTPATIENT
Start: 2024-04-30
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
## 2024-04-30 NOTE — PROGRESS NOTES
Mikael Matthews was seen for an audiometric evaluation and tympanogram today. Referred back to physician.    Joyce Morin, AuD

## 2024-05-10 ENCOUNTER — HOSPITAL ENCOUNTER (OUTPATIENT)
Dept: GENERAL RADIOLOGY | Facility: HOSPITAL | Age: 29
Discharge: HOME | End: 2024-05-10
Attending: INTERNAL MEDICINE

## 2024-05-10 ENCOUNTER — LAB ENCOUNTER (OUTPATIENT)
Dept: LAB | Facility: HOSPITAL | Age: 29
End: 2024-05-10
Attending: INTERNAL MEDICINE

## 2024-05-10 ENCOUNTER — OFFICE VISIT (OUTPATIENT)
Dept: RHEUMATOLOGY | Facility: CLINIC | Age: 29
End: 2024-05-10
Payer: COMMERCIAL

## 2024-05-10 VITALS
HEIGHT: 72 IN | SYSTOLIC BLOOD PRESSURE: 107 MMHG | WEIGHT: 219.19 LBS | BODY MASS INDEX: 29.69 KG/M2 | RESPIRATION RATE: 16 BRPM | DIASTOLIC BLOOD PRESSURE: 72 MMHG | HEART RATE: 73 BPM

## 2024-05-10 DIAGNOSIS — M54.50 CHRONIC LOW BACK PAIN, UNSPECIFIED BACK PAIN LATERALITY, UNSPECIFIED WHETHER SCIATICA PRESENT: ICD-10-CM

## 2024-05-10 DIAGNOSIS — M54.50 CHRONIC LOW BACK PAIN, UNSPECIFIED BACK PAIN LATERALITY, UNSPECIFIED WHETHER SCIATICA PRESENT: Primary | ICD-10-CM

## 2024-05-10 DIAGNOSIS — G89.29 CHRONIC LOW BACK PAIN, UNSPECIFIED BACK PAIN LATERALITY, UNSPECIFIED WHETHER SCIATICA PRESENT: ICD-10-CM

## 2024-05-10 DIAGNOSIS — G89.29 CHRONIC LOW BACK PAIN, UNSPECIFIED BACK PAIN LATERALITY, UNSPECIFIED WHETHER SCIATICA PRESENT: Primary | ICD-10-CM

## 2024-05-10 LAB
C3 SERPL-MCNC: 140.9 MG/DL (ref 84–160)
C4 SERPL-MCNC: 21.6 MG/DL (ref 12–36)

## 2024-05-10 PROCEDURE — 72202 X-RAY EXAM SI JOINTS 3/> VWS: CPT | Performed by: INTERNAL MEDICINE

## 2024-05-10 PROCEDURE — 86160 COMPLEMENT ANTIGEN: CPT

## 2024-05-10 PROCEDURE — 3008F BODY MASS INDEX DOCD: CPT | Performed by: INTERNAL MEDICINE

## 2024-05-10 PROCEDURE — 36415 COLL VENOUS BLD VENIPUNCTURE: CPT | Performed by: INTERNAL MEDICINE

## 2024-05-10 PROCEDURE — 86039 ANTINUCLEAR ANTIBODIES (ANA): CPT | Performed by: INTERNAL MEDICINE

## 2024-05-10 PROCEDURE — 99244 OFF/OP CNSLTJ NEW/EST MOD 40: CPT | Performed by: INTERNAL MEDICINE

## 2024-05-10 PROCEDURE — 86038 ANTINUCLEAR ANTIBODIES: CPT | Performed by: INTERNAL MEDICINE

## 2024-05-10 PROCEDURE — 81374 HLA I TYPING 1 ANTIGEN LR: CPT

## 2024-05-10 PROCEDURE — 86235 NUCLEAR ANTIGEN ANTIBODY: CPT | Performed by: INTERNAL MEDICINE

## 2024-05-10 PROCEDURE — 72110 X-RAY EXAM L-2 SPINE 4/>VWS: CPT | Performed by: INTERNAL MEDICINE

## 2024-05-10 PROCEDURE — 86225 DNA ANTIBODY NATIVE: CPT | Performed by: INTERNAL MEDICINE

## 2024-05-10 PROCEDURE — 3078F DIAST BP <80 MM HG: CPT | Performed by: INTERNAL MEDICINE

## 2024-05-10 PROCEDURE — 3074F SYST BP LT 130 MM HG: CPT | Performed by: INTERNAL MEDICINE

## 2024-05-10 RX ORDER — CELECOXIB 100 MG/1
100 CAPSULE ORAL 2 TIMES DAILY
Qty: 60 CAPSULE | Refills: 2 | Status: SHIPPED | OUTPATIENT
Start: 2024-05-10

## 2024-05-10 NOTE — PROGRESS NOTES
RHEUMATOLOGY CLINIC- NEW PATIENT    Mikael Matthews is a 28 year old male.    ASSESSMENT/PLAN:       ICD-10-CM    1. Chronic low back pain, unspecified back pain laterality, unspecified whether sciatica present  M54.50 HLA B 27 Disease Association    G89.29 Anti-Nuclear Antibody (JESÚS) by IFA, Reflex Titer + Specific Antibodies     Complement C3, Serum     Complement C4, Serum     XR LUMBAR SPINE (MIN 4 VIEWS) (CPT=72110)     XR SACROILIAC JOINTS (MIN 3 VIEWS) (CPT=72202)        DISCUSSION:  Patient presents as a new outpatient referral from his PCP for several years of polyarthralgias especially of his low back, knees, ankles.  He has features and risk factors for mechanical arthralgias especially given his symptoms predominately involving weightbearing joints, his service in the , and how certain times symptoms are worse with activity at the end of the day.  However, he also has features of an inflammatory back pain given his age and AM stiffness with possible gelling phenomenon.  No synovitis on current exam, negative for bear testing bilaterally.  Last imaging several years ago.  Therefore, will check lumbar spine and SI joint x-rays.  Will also check an updated JESÚS by IFA and genetic testing for possible spondyloarthropathy especially given his father with similar symptoms in his 30s.  Also discussed trial of Celebrex and physician directed therapy.  Patient to contact office in a few weeks to further discuss with possible MRI if unremarkable results.    PLAN:  - Start Celebrex 100 mg twice daily with food.  Discussed with patient to avoid other OTC NSAIDs while on Celebrex given increased risk of side effects including GI upset and GI bleed.  -Physician directed therapy discussed with patient.  - Check lumbar spine and SI joint x-rays for independent review of possible inflammatory/erosive changes.  Possible MRI if unremarkable.  - Patient to obtain the above nonfasting lab work  - Consult/evaluation  communicated with referring physician/provider.    I will MyChart patient on receipt of above results.  Otherwise patient to contact office in about 2 weeks to further discuss response to Celebrex with possible MRI pending above.    Ric MartínjairDO  5/10/2024   2:12 PM    HPI:     Chief Complaint   Patient presents with    Consult     Arthralgia, unspecified joint onset since 2015       I had the pleasure of seeing Mikael Matthews on 5/10/2024 as a new outpatient consultation for polyarthralgias. The patient was originally referred by his PCP, Dr. Tye Matthews.     28 year old male w/ PMH HLD, vitamin D deficiency, scoliosis, PTSD/depression, eosinophilic esophagitis who presents to clinic today.  Patient served in the  and since then, has noted polyarthralgias especially affecting his weightbearing joints including his low back, knees, and ankles.  Symptoms can be aggravated first thing waking up, after prolonged sitting in his car (patient works as a ), and take hours to loosen up.  However, symptoms are also worse with activity and can be worse at the end of the day.  His father also had back pain in his 30s as well but also served in the .  No unexplained fever, chills, history of pleuritis, oral/nasal ulcers, Raynaud's phenomenon, photosensitivity, uveitis/iritis, rash, hematuria.  No family history of other autoimmune diseases such as gout, lupus, rheumatoid arthritis, psoriasis.    Current Medications:  N/A    Medication History:  Ibuprofen - ineffective    Interval History:   See Above    HISTORY:  History reviewed. No pertinent past medical history.   Social Hx Reviewed   Family Hx Reviewed     Medications (Active prior to today's visit):  Current Outpatient Medications   Medication Sig Dispense Refill    fluticasone propionate 50 MCG/ACT Nasal Suspension 2 sprays by Nasal route daily. 16 g 3    sertraline 25 MG Oral Tab Take 1 tablet (25 mg total) by mouth daily.       doxepin 10 MG Oral Cap Take 1 capsule (10 mg total) by mouth.      Propranolol HCl ER 60 MG Oral Capsule SR 24 Hr Take 1 capsule (60 mg total) by mouth daily. 90 capsule 1    ergocalciferol 1.25 MG (93806 UT) Oral Cap Take 1 capsule (50,000 Units total) by mouth once a week for 12 doses. 12 capsule 0    SUMAtriptan (IMITREX) 25 MG Oral Tab Take 1 tablet (25 mg total) by mouth every 2 (two) hours as needed for Migraine. Use at onset; repeat once after 2 HRS-ONLY 2 IN 24 HR MAX 9 tablet 1       Allergies:  No Known Allergies      ROS:   Review of Systems   Constitutional:  Negative for chills and fever.   HENT:  Negative for congestion, hearing loss, mouth sores, nosebleeds and trouble swallowing.    Eyes:  Negative for photophobia, pain, redness and visual disturbance.   Respiratory:  Negative for cough and shortness of breath.    Cardiovascular:  Negative for chest pain, palpitations and leg swelling.   Gastrointestinal:  Negative for abdominal pain, blood in stool, diarrhea and nausea.   Endocrine: Negative for cold intolerance and heat intolerance.   Genitourinary:  Negative for dysuria, frequency and hematuria.   Musculoskeletal:  Positive for arthralgias, back pain and gait problem. Negative for joint swelling, myalgias, neck pain and neck stiffness.   Skin:  Negative for color change and rash.   Neurological:  Negative for dizziness, weakness, numbness and headaches.   Psychiatric/Behavioral:  Negative for confusion and dysphoric mood.         PHYSICAL EXAM:     Constitutional:  Well developed, Well nourished, No acute distress  HENT:  Normocephalic, Atraumatic, Bilateral external ears normal, Oropharynx moist, No oral exudates.  Neck: Normal range of motion, No tenderness, Supple, No stridor.  Eyes:  PERRL, EOMI, Conjunctiva normal, No discharge.  Respiratory:  Normal breath sounds, No respiratory distress, No wheezing.  Cardiovascular:  Normal heart rate, Normal rhythm, No murmurs, No rubs, No gallops.  GI:   Bowel sounds normal, Soft, No tenderness, No masses, No pulsatile masses.  : No CVA tenderness.  Musculoskeletal: Negative TAWANDA testing bilaterally.  A comprehensive 28 count joint exam was done and was negative for swelling or tenderness except as noted. Inspections for misalignment, asymmetry, crepitation, defects, tenderness, masses, nodules, effusions, range of motion, and stability in the upper and lower extremities bilaterally are all normal unless noted.           Integument:  Warm, Dry, No erythema, No rash.  Lymphatic:  No lymphadenopathy noted.  Neurologic:  Alert & oriented x 3, Normal motor function, Normal sensory function, No focal deficits noted.  Psychiatric:  Affect normal, Judgment normal, Mood normal.    LABS:   Prior lab work reviewed and notable for:    3/21/2024:  JESÚS by HANNA negative  CCP 1.6 WNL, RF less than 10 WNL  Uric acid 5.6  Vitamin D 8.4 (low)  CRP less than 0.29 WNL, ESR 11 WNL    TSH 0.562 WNL, free T40.9 WNL  CMP with normal renal and hepatic function  CBC without cytopenias nor leukocytosis    Imaging:     N/A

## 2024-05-13 LAB — NUCLEAR IGG TITR SER IF: POSITIVE {TITER}

## 2024-05-14 LAB
ANA NUCLEOLAR TITR SER IF: 80 {TITER}
CENTROMERE IGG SER-ACNC: <0.4 U/ML
DSDNA AB TITR SER: <10 {TITER}
ENA JO1 AB SER IA-ACNC: <0.3 U/ML
ENA RNP IGG SER IA-ACNC: 0.8 U/ML
ENA SCL70 IGG SER IA-ACNC: <0.6 U/ML
ENA SM IGG SER IA-ACNC: <0.7 U/ML
ENA SS-A IGG SER IA-ACNC: 1.8 U/ML
ENA SS-B IGG SER IA-ACNC: <0.4 U/ML
U1 SNRNP IGG SER IA-ACNC: 0.7 U/ML

## 2024-05-15 ENCOUNTER — PATIENT MESSAGE (OUTPATIENT)
Dept: FAMILY MEDICINE CLINIC | Facility: CLINIC | Age: 29
End: 2024-05-15

## 2024-05-15 PROBLEM — K58.0 IRRITABLE BOWEL SYNDROME WITH DIARRHEA: Status: ACTIVE | Noted: 2024-05-15

## 2024-05-15 PROBLEM — F41.9 ANXIETY: Status: ACTIVE | Noted: 2024-05-15

## 2024-05-15 PROBLEM — G43.909 MIGRAINE: Status: ACTIVE | Noted: 2024-05-15

## 2024-05-16 NOTE — TELEPHONE ENCOUNTER
From: Mikael Matthews  To: Tye Matthews  Sent: 5/15/2024 4:43 PM CDT  Subject: [employer] benefit-[employer] letter    Attached is an example of a nexus letter. As well as my  discharge papers ().    Respectfully,  Mikael Matthews  [phone]

## 2024-05-17 ENCOUNTER — LAB ENCOUNTER (OUTPATIENT)
Dept: LAB | Age: 29
End: 2024-05-17
Attending: STUDENT IN AN ORGANIZED HEALTH CARE EDUCATION/TRAINING PROGRAM

## 2024-05-17 DIAGNOSIS — K58.0 IRRITABLE BOWEL SYNDROME WITH DIARRHEA: ICD-10-CM

## 2024-05-17 LAB — IGA SERPL-MCNC: 353 MG/DL (ref 70–312)

## 2024-05-17 PROCEDURE — 82784 ASSAY IGA/IGD/IGG/IGM EACH: CPT

## 2024-05-17 PROCEDURE — 86364 TISS TRNSGLTMNASE EA IG CLAS: CPT

## 2024-05-17 PROCEDURE — 36415 COLL VENOUS BLD VENIPUNCTURE: CPT

## 2024-05-18 DIAGNOSIS — G43.809 OTHER MIGRAINE WITHOUT STATUS MIGRAINOSUS, NOT INTRACTABLE: ICD-10-CM

## 2024-05-18 RX ORDER — SUMATRIPTAN 25 MG/1
TABLET, FILM COATED ORAL
Qty: 9 TABLET | Refills: 1 | Status: SHIPPED | OUTPATIENT
Start: 2024-05-18

## 2024-05-18 NOTE — TELEPHONE ENCOUNTER
Name from pharmacy: SUMATRIPTAN 25MG TABLETS          Will file in chart as: SUMATRIPTAN 25 MG Oral Tab    Sig: TAKE 1 TABLET BY MOUTH AT ONSET OF MIGRAINE. REPEAT ONCE AFTER 2 HOURS. MAXIMUM OF 2 PER DAY.    Disp: 9 tablet    Refills: 1 (Pharmacy requested: Not specified)    Start: 5/18/2024    Class: Normal    Non-formulary For: Other migraine without status migrainosus, not intractable    Last ordered: 1 month ago (3/21/2024) by Tye Matthews MD    Last refill: 5/9/2024    Rx #: [id number]    Neurology Medications [id number]/2024 03:13 AM   Protocol Details In person appointment or virtual visit in the past 6 mos or appointment in next 3 mos      [id number] [address] AT [facility] [address], [phone], [phone]

## 2024-05-20 ENCOUNTER — LAB ENCOUNTER (OUTPATIENT)
Dept: LAB | Age: 29
End: 2024-05-20
Attending: FAMILY MEDICINE

## 2024-05-20 DIAGNOSIS — K58.0 IRRITABLE BOWEL SYNDROME WITH DIARRHEA: ICD-10-CM

## 2024-05-20 LAB — TTG IGA SER-ACNC: 1.9 U/ML (ref ?–7)

## 2024-05-20 PROCEDURE — 87427 SHIGA-LIKE TOXIN AG IA: CPT

## 2024-05-20 PROCEDURE — 87272 CRYPTOSPORIDIUM AG IF: CPT

## 2024-05-20 PROCEDURE — 87329 GIARDIA AG IA: CPT

## 2024-05-20 PROCEDURE — 82656 EL-1 FECAL QUAL/SEMIQ: CPT

## 2024-05-20 PROCEDURE — 87045 FECES CULTURE AEROBIC BACT: CPT

## 2024-05-20 PROCEDURE — 87177 OVA AND PARASITES SMEARS: CPT

## 2024-05-20 PROCEDURE — 87046 STOOL CULTR AEROBIC BACT EA: CPT

## 2024-05-20 PROCEDURE — 87209 SMEAR COMPLEX STAIN: CPT

## 2024-05-20 PROCEDURE — 83993 ASSAY FOR CALPROTECTIN FECAL: CPT

## 2024-05-20 PROCEDURE — 87493 C DIFF AMPLIFIED PROBE: CPT

## 2024-05-21 LAB
C DIFF TOX B STL QL: NEGATIVE
CALPROTECTIN STL-MCNT: 16.4 ΜG/G (ref ?–50)
CRYPTOSP AG STL QL IA: NEGATIVE
G LAMBLIA AG STL QL IA: NEGATIVE
HLA-B27: NEGATIVE

## 2024-05-21 NOTE — PROGRESS NOTES
Jesus Youssef,    Your recent blood work shows that there are NO signs of celiac disease.   Your recent blood work shows that your immunoglobulin, which is a protein that is part of your immune system, level is slightly above normal limits - I recommend you follow-up with your primary care physician for further evaluation of this finding for your immunoglobulin.     Please let me know if you have any questions or concerns.     Sincerely,  Farhan Ford MD

## 2024-05-21 NOTE — PROGRESS NOTES
Jesus Youssef,    Your recent stool study shows NO signs of Giardia infection.     Please let me know if you have any questions or concerns.     Sincerely,  Farhan Ford MD

## 2024-05-22 NOTE — PROGRESS NOTES
Jesus Youssef,    Your recent stool study shows NO signs of a C diff infection.     Please let me know if you have any questions or concerns.     Sincerely,  Farhan Ford MD

## 2024-05-24 LAB — PANCREATIC ELAST FECAL: >800 UG ELAST./G

## 2024-06-07 DIAGNOSIS — G89.29 CHRONIC LOW BACK PAIN, UNSPECIFIED BACK PAIN LATERALITY, UNSPECIFIED WHETHER SCIATICA PRESENT: Primary | ICD-10-CM

## 2024-06-07 DIAGNOSIS — M54.50 CHRONIC LOW BACK PAIN, UNSPECIFIED BACK PAIN LATERALITY, UNSPECIFIED WHETHER SCIATICA PRESENT: Primary | ICD-10-CM

## 2024-08-27 RX ORDER — FLUTICASONE PROPIONATE 50 MCG
2 SPRAY, SUSPENSION (ML) NASAL DAILY
Qty: 16 G | Refills: 3 | Status: SHIPPED | OUTPATIENT
Start: 2024-08-27

## 2024-08-27 NOTE — TELEPHONE ENCOUNTER
Requested Prescriptions     Pending Prescriptions Disp Refills    FLUTICASONE PROPIONATE 50 MCG/ACT Nasal Suspension [Pharmacy Med Name: FLUTICASONE 50MCG NASAL SP (120) RX] 16 g 3     Sig: SHAKE LIQUID AND USE 2 SPRAYS IN EACH NOSTRIL DAILY       FILLED- 4/30/24  LOV- 4/30/24    No future appointments.

## 2024-10-31 DIAGNOSIS — G43.809 OTHER MIGRAINE WITHOUT STATUS MIGRAINOSUS, NOT INTRACTABLE: ICD-10-CM

## 2024-10-31 DIAGNOSIS — F41.9 ANXIETY: ICD-10-CM

## 2024-10-31 RX ORDER — PROPRANOLOL HYDROCHLORIDE 60 MG/1
1 CAPSULE, EXTENDED RELEASE ORAL DAILY
Qty: 90 CAPSULE | Refills: 1 | Status: SHIPPED | OUTPATIENT
Start: 2024-10-31

## (undated) NOTE — LETTER
05/16/24    Mikael Matthews      To Whom It May Concern:    I have been asked to write a letter of support of Mikael Cassie's claim.  I am Mikael's primary care physician and specialize in family medicine.  I have started seeing him this year as a patient.  He carries a diagnosis of PTSD, depression, anxiety, irritable bowel syndrome, migraines which were previously diagnosed by other specialists and by myself.  He is also under the care of of a psychiatrist.  He is on medications.  I believe that these conditions can be attributed to the fact that he had served in the .    It is my professional opinion that the 's current diagnoses is a direct result of him and likely due to events that have occurred when he was serving in the .    I believe that his conditions will be longstanding and will be managed and monitored closely with both therapy and medications at this point.    Thank you for your consideration and accommodation of this matter.      Sincerely,        Tye Matthews MD  05/16/24, 2:00 PM

## (undated) NOTE — LETTER
05/16/24    Mikael Matthews      To Whom It May Concern:    I have been asked to write a letter of support of Mikael Cassie's claim.  I am Mikael's primary care physician and specialize in family medicine.  I have started seeing him this year as a patient.  He carries a diagnosis of PTSD, depression, anxiety, irritable bowel syndrome which were previously diagnosed by other specialist and by myself.  He is also under the care of of a psychiatrist.  He is on medications.  I believe that these conditions can be attributed to the fact that he had served in the .    It is my professional opinion that the 's current diagnosis is a direct result of him and likely due to events that have occurred when he was serving in the .    I believe that his conditions will be longstanding and will be managed and monitored closely with both therapy and medications at this point.    Thank you for your consideration and accommodation of this matter.          Sincerely,        Tye Matthews MD  05/16/24, 11:32 AM